# Patient Record
Sex: FEMALE | Race: WHITE | URBAN - METROPOLITAN AREA
[De-identification: names, ages, dates, MRNs, and addresses within clinical notes are randomized per-mention and may not be internally consistent; named-entity substitution may affect disease eponyms.]

---

## 2017-06-08 ENCOUNTER — OFFICE VISIT (OUTPATIENT)
Dept: FAMILY MEDICINE CLINIC | Facility: CLINIC | Age: 32
End: 2017-06-08

## 2017-06-08 VITALS
BODY MASS INDEX: 20 KG/M2 | HEART RATE: 98 BPM | TEMPERATURE: 98 F | RESPIRATION RATE: 20 BRPM | SYSTOLIC BLOOD PRESSURE: 101 MMHG | WEIGHT: 128 LBS | DIASTOLIC BLOOD PRESSURE: 89 MMHG

## 2017-06-08 DIAGNOSIS — J06.9 ACUTE URI: ICD-10-CM

## 2017-06-08 PROCEDURE — 99212 OFFICE O/P EST SF 10 MIN: CPT | Performed by: FAMILY MEDICINE

## 2017-06-08 PROCEDURE — 99213 OFFICE O/P EST LOW 20 MIN: CPT | Performed by: FAMILY MEDICINE

## 2017-06-08 RX ORDER — AZITHROMYCIN 250 MG/1
TABLET, FILM COATED ORAL
Qty: 6 TABLET | Refills: 1 | Status: SHIPPED | OUTPATIENT
Start: 2017-06-08 | End: 2017-10-04

## 2017-06-08 NOTE — PROGRESS NOTES
HPI:    Patient ID: Isidro Jones is a 28year old female. HPI Comments: Pt presents with cold symptoms and cough for 2 weeks. Pt has had dry cough, sore throat. No fevers. Pt has tried otc remedies without relief. Pt states no sick contacts.          Vick

## 2017-09-22 ENCOUNTER — MED REC SCAN ONLY (OUTPATIENT)
Dept: INTERNAL MEDICINE CLINIC | Facility: CLINIC | Age: 32
End: 2017-09-22

## 2017-10-04 ENCOUNTER — OFFICE VISIT (OUTPATIENT)
Dept: FAMILY MEDICINE CLINIC | Facility: CLINIC | Age: 32
End: 2017-10-04

## 2017-10-04 VITALS
HEART RATE: 92 BPM | BODY MASS INDEX: 21.34 KG/M2 | SYSTOLIC BLOOD PRESSURE: 97 MMHG | DIASTOLIC BLOOD PRESSURE: 67 MMHG | TEMPERATURE: 98 F | RESPIRATION RATE: 20 BRPM | WEIGHT: 125 LBS | HEIGHT: 64 IN

## 2017-10-04 DIAGNOSIS — Z00.00 ROUTINE PHYSICAL EXAMINATION: ICD-10-CM

## 2017-10-04 DIAGNOSIS — J06.9 ACUTE URI: ICD-10-CM

## 2017-10-04 PROCEDURE — 99395 PREV VISIT EST AGE 18-39: CPT | Performed by: FAMILY MEDICINE

## 2017-10-04 RX ORDER — AZITHROMYCIN 250 MG/1
TABLET, FILM COATED ORAL
Qty: 6 TABLET | Refills: 1 | Status: SHIPPED | OUTPATIENT
Start: 2017-10-04

## 2017-10-04 NOTE — PROGRESS NOTES
HPI:    Patient ID: Kulwant Renee is a 33year old female. Patient is here for routine physical exam. No acute issues. No significant chronic medical problems. Patient is requesting testing. Diet has been good and.  Past medical history, family history, and unremarkable. Screening tests were discussed, and after discussion, will check lab work as below. Healthy diet, exercise, and weight were discussed. To call if problems and follow up and further management after testing.  Routine follow up with her gynecolo

## 2017-10-09 ENCOUNTER — TELEPHONE (OUTPATIENT)
Dept: FAMILY MEDICINE CLINIC | Facility: CLINIC | Age: 32
End: 2017-10-09

## 2017-10-09 DIAGNOSIS — Z00.00 ROUTINE PHYSICAL EXAMINATION: Primary | ICD-10-CM

## 2017-10-09 NOTE — TELEPHONE ENCOUNTER
Pt has labs pending from physical exam - pt requesting if CK level can be added. Pt states her daughter was born last year and had higher than normal CK levels and she was hoping to have hers checked as well.     Daughter's CK result was from 11/6/16 and va

## 2019-06-03 ENCOUNTER — TELEPHONE (OUTPATIENT)
Dept: CASE MANAGEMENT | Age: 34
End: 2019-06-03

## 2019-08-01 ENCOUNTER — TELEPHONE (OUTPATIENT)
Dept: CASE MANAGEMENT | Age: 34
End: 2019-08-01

## (undated) NOTE — LETTER
04/16/18        [address] [place] [id number]      Dear Lanie Sanford records indicate that you have outstanding lab work and or testing that was ordered for you and has not yet been completed:          Comp Metabolic Panel (14) [E

## (undated) NOTE — MR AVS SNAPSHOT
[facility] SPECIALTY [facility] [place] [address] [address] [id number] [phone]               Thank you for choosing us for your health care visit with Constance Johnson MD.  We are glad to serve you and happy to provide you with this summary of y If you've recently had a stay at the Hospital you can access your discharge instructions in [employer] by going to Visits < Admission Summaries.  If you've been to the Emergency Department or your doctor's office, you can view your past visit information in My [facility] online at  [email or web address]

## (undated) NOTE — LETTER
12/15/17        [address]  [place] [address]      Dear Armando Burton records indicate that you have outstanding lab work and or testing that was ordered for you and has not yet been completed:          Comp Metabolic Panel (14) [E

## (undated) NOTE — LETTER
06/18/18        [address] [place] [phone]      Dear Becky Hernández records indicate that you have outstanding lab work and or testing that was ordered for you and has not yet been completed:          Comp Metabolic Panel (14) [E

## (undated) NOTE — LETTER
03/15/18        [address]  [address] [facility] [address]      Dear Armando Burton records indicate that you have outstanding lab work and or testing that was ordered for you and has not yet been completed:          CK (Creatine Kinase) (Not Cr